# Patient Record
Sex: FEMALE | Race: WHITE | Employment: PART TIME | URBAN - METROPOLITAN AREA
[De-identification: names, ages, dates, MRNs, and addresses within clinical notes are randomized per-mention and may not be internally consistent; named-entity substitution may affect disease eponyms.]

---

## 2017-03-31 PROCEDURE — 87624 HPV HI-RISK TYP POOLED RSLT: CPT | Performed by: INTERNAL MEDICINE

## 2017-03-31 PROCEDURE — 88175 CYTOPATH C/V AUTO FLUID REDO: CPT | Performed by: INTERNAL MEDICINE

## 2017-10-18 NOTE — TELEPHONE ENCOUNTER
[employer] message sent.  Please assist patient in scheduling an appointment in order to process refill request.       Medication: Cyclobenzaprine    Date of last refill: 12/09/17  [place] n/a    Last office visit: 06/03/16

## 2017-10-18 NOTE — TELEPHONE ENCOUNTER
From: Crystal Malone  Sent: 10/18/2017 1:31 PM CDT  Subject: Medication Renewal Request    Tom Carroll.  Nilda would like a refill of the following medications:     Cyclobenzaprine HCl (CYCLOBENZAPRINE) 10 MG Oral Tab Taylor Jorge MD]   Patient Leticia Corrales

## 2017-11-02 RX ORDER — CYCLOBENZAPRINE HCL 10 MG
10 TABLET ORAL 3 TIMES DAILY PRN
Qty: 30 TABLET | Refills: 6
Start: 2017-11-02

## 2018-07-16 PROCEDURE — 84480 ASSAY TRIIODOTHYRONINE (T3): CPT | Performed by: INTERNAL MEDICINE

## 2018-07-16 PROCEDURE — 82607 VITAMIN B-12: CPT | Performed by: INTERNAL MEDICINE

## 2018-07-16 PROCEDURE — 82746 ASSAY OF FOLIC ACID SERUM: CPT | Performed by: INTERNAL MEDICINE

## 2018-07-16 PROCEDURE — 80178 ASSAY OF LITHIUM: CPT | Performed by: INTERNAL MEDICINE

## 2019-03-21 PROCEDURE — 88304 TISSUE EXAM BY PATHOLOGIST: CPT | Performed by: SURGERY

## 2019-05-15 PROCEDURE — 87086 URINE CULTURE/COLONY COUNT: CPT | Performed by: INTERNAL MEDICINE

## 2021-04-05 PROBLEM — K21.9 LARYNGOPHARYNGEAL REFLUX (LPR): Status: ACTIVE | Noted: 2021-04-05

## 2021-04-05 PROBLEM — R19.8 GLOBUS PHARYNGEUS: Status: ACTIVE | Noted: 2021-04-05

## 2021-09-09 ENCOUNTER — APPOINTMENT (OUTPATIENT)
Dept: GENERAL RADIOLOGY | Age: 51
End: 2021-09-09
Attending: PHYSICIAN ASSISTANT
Payer: COMMERCIAL

## 2021-09-09 ENCOUNTER — APPOINTMENT (OUTPATIENT)
Dept: CT IMAGING | Age: 51
End: 2021-09-09
Attending: PHYSICIAN ASSISTANT
Payer: COMMERCIAL

## 2021-09-09 ENCOUNTER — HOSPITAL ENCOUNTER (EMERGENCY)
Age: 51
Discharge: HOME | End: 2021-09-09
Attending: EMERGENCY MEDICINE
Payer: COMMERCIAL

## 2021-09-09 VITALS
WEIGHT: 148 LBS | OXYGEN SATURATION: 100 % | DIASTOLIC BLOOD PRESSURE: 85 MMHG | RESPIRATION RATE: 14 BRPM | SYSTOLIC BLOOD PRESSURE: 131 MMHG | HEART RATE: 71 BPM | HEIGHT: 68 IN | BODY MASS INDEX: 22.43 KG/M2 | TEMPERATURE: 98 F

## 2021-09-09 DIAGNOSIS — S13.4XXA WHIPLASH INJURY TO NECK, INITIAL ENCOUNTER: ICD-10-CM

## 2021-09-09 DIAGNOSIS — S09.90XA INJURY OF HEAD, INITIAL ENCOUNTER: ICD-10-CM

## 2021-09-09 DIAGNOSIS — M47.812 OSTEOARTHRITIS OF CERVICAL SPINE, UNSPECIFIED SPINAL OSTEOARTHRITIS COMPLICATION STATUS: Primary | ICD-10-CM

## 2021-09-09 PROCEDURE — 99284 EMERGENCY DEPT VISIT MOD MDM: CPT

## 2021-09-09 PROCEDURE — 70450 CT HEAD/BRAIN W/O DYE: CPT | Performed by: PHYSICIAN ASSISTANT

## 2021-09-09 PROCEDURE — 72052 X-RAY EXAM NECK SPINE 6/>VWS: CPT | Performed by: PHYSICIAN ASSISTANT

## 2021-09-09 RX ORDER — IBUPROFEN 600 MG/1
600 TABLET ORAL ONCE
Status: COMPLETED | OUTPATIENT
Start: 2021-09-09 | End: 2021-09-09

## 2021-09-09 RX ORDER — CYCLOBENZAPRINE HCL 10 MG
10 TABLET ORAL NIGHTLY
Qty: 20 TABLET | Refills: 0 | Status: SHIPPED | OUTPATIENT
Start: 2021-09-09 | End: 2021-09-27

## 2021-09-09 RX ORDER — DEXAMETHASONE 4 MG/1
16 TABLET ORAL ONCE
Status: COMPLETED | OUTPATIENT
Start: 2021-09-09 | End: 2021-09-09

## 2021-09-09 RX ORDER — NAPROXEN 500 MG/1
500 TABLET ORAL 2 TIMES DAILY PRN
Qty: 20 TABLET | Refills: 0 | Status: SHIPPED | OUTPATIENT
Start: 2021-09-09 | End: 2021-09-27
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
